# Patient Record
Sex: MALE | Race: BLACK OR AFRICAN AMERICAN | NOT HISPANIC OR LATINO | Employment: UNEMPLOYED | URBAN - METROPOLITAN AREA
[De-identification: names, ages, dates, MRNs, and addresses within clinical notes are randomized per-mention and may not be internally consistent; named-entity substitution may affect disease eponyms.]

---

## 2019-09-13 ENCOUNTER — TELEPHONE (OUTPATIENT)
Dept: TRANSPLANT | Facility: CLINIC | Age: 64
End: 2019-09-13

## 2019-09-13 NOTE — TELEPHONE ENCOUNTER
----- Message from Rosa Parsons sent at 9/12/2019  5:21 PM CDT -----    We have the pt records and they are now pending review by the referral nurse.  By:Rosa Parsons

## 2019-09-18 ENCOUNTER — DOCUMENTATION ONLY (OUTPATIENT)
Dept: TRANSPLANT | Facility: CLINIC | Age: 64
End: 2019-09-18

## 2019-09-18 NOTE — LETTER
September 18, 2019    Sally Cazares MD  [address]  [address] [address]      Dear Dr. Cazares    Patient: Gage Darby   MR Number: [id number]   YOB: 1955     Thank you for the referral of Gage Darby to the [facility] program. An initial appointment will be scheduled for your patient with one of our Hepatologists.      Thank you again for your trust in our program.  If there is anything we can do for you or your staff, please feel free to contact us.        Sincerely,        [facility]  [address] [place] [id number]  [phone]

## 2019-09-18 NOTE — LETTER
September 18, 2019    Gage Darby  [address] [address]      Dear Gage Darby:    Your doctor has referred you to the [facility]. We are sending this letter to remind you to make an appointment with us to complete the referral process.     Please call us at [phone] to schedule an appointment. We look forward to seeing you soon.     If you received a call and have been scheduled, please disregard this letter.       Sincerely,        [facility]   [address] [place] [address]  [phone]

## 2019-09-18 NOTE — NURSING
Pt records reviewed.  Pt will be referred to [facility] due to HEP C   Initial referral received from Sally Cazares NP   Referral letter sent to provider and patient.

## 2019-09-24 ENCOUNTER — TELEPHONE (OUTPATIENT)
Dept: HEPATOLOGY | Facility: CLINIC | Age: 64
End: 2019-09-24

## 2019-10-29 ENCOUNTER — TELEPHONE (OUTPATIENT)
Dept: HEPATOLOGY | Facility: CLINIC | Age: 64
End: 2019-10-29

## 2019-10-29 NOTE — TELEPHONE ENCOUNTER
Sally Cazares NP requested that patient be scheduled for consult.  Attempt made to reach sister.  LVM asking that she give us a call back.  Will attempt to schedule hep c consult appt with PA Scheuermann in [place].  Clinicals already scheduled into media.

## 2019-10-29 NOTE — TELEPHONE ENCOUNTER
----- Message from Suleman Mendiola PA-C sent at 10/29/2019 12:23 PM CDT -----  Regarding: FW: New number      ----- Message -----  From: Tonio Peterson  Sent: 10/29/2019  12:20 PM CDT  To: Suleman Mendiola PA-C  Subject: New number                                       The patients sister in law asked to be contacted on his behalf. The number is [phone]

## 2020-10-07 ENCOUNTER — TELEPHONE (OUTPATIENT)
Dept: SURGERY | Facility: CLINIC | Age: 65
End: 2020-10-07

## 2020-10-07 NOTE — TELEPHONE ENCOUNTER
Called patient at all available numbers in reference to a referral to  Ambulatory Colorectal Surgery for colon cancer screening,invalid numbers

## 2020-10-23 PROBLEM — L98.9 SKIN SORE: Status: ACTIVE | Noted: 2020-10-23

## 2020-10-27 LAB — HCV AB SER-ACNC: NORMAL

## 2022-01-06 ENCOUNTER — PATIENT OUTREACH (OUTPATIENT)
Dept: ADMINISTRATIVE | Facility: HOSPITAL | Age: 67
End: 2022-01-06
Payer: MEDICAID

## 2022-01-18 ENCOUNTER — OFFICE VISIT (OUTPATIENT)
Dept: PRIMARY CARE CLINIC | Facility: CLINIC | Age: 67
End: 2022-01-18
Payer: MEDICAID

## 2022-01-18 VITALS
WEIGHT: 157.44 LBS | HEIGHT: 73 IN | SYSTOLIC BLOOD PRESSURE: 106 MMHG | HEART RATE: 81 BPM | OXYGEN SATURATION: 97 % | DIASTOLIC BLOOD PRESSURE: 78 MMHG | BODY MASS INDEX: 20.86 KG/M2

## 2022-01-18 DIAGNOSIS — B18.2 CHRONIC HEPATITIS C WITHOUT HEPATIC COMA: ICD-10-CM

## 2022-01-18 DIAGNOSIS — R11.0 NAUSEA: ICD-10-CM

## 2022-01-18 DIAGNOSIS — F41.9 ANXIETY: ICD-10-CM

## 2022-01-18 DIAGNOSIS — J44.9 CHRONIC OBSTRUCTIVE PULMONARY DISEASE, UNSPECIFIED COPD TYPE: ICD-10-CM

## 2022-01-18 DIAGNOSIS — G89.29 OTHER CHRONIC PAIN: Primary | ICD-10-CM

## 2022-01-18 DIAGNOSIS — K92.2 UGIB (UPPER GASTROINTESTINAL BLEED): ICD-10-CM

## 2022-01-18 DIAGNOSIS — B18.1 CHRONIC VIRAL HEPATITIS B WITHOUT DELTA AGENT AND WITHOUT COMA: ICD-10-CM

## 2022-01-18 PROBLEM — R19.09 UPPER ABDOMINAL MASS: Status: ACTIVE | Noted: 2020-12-09

## 2022-01-18 PROBLEM — F20.9 SCHIZOPHRENIA: Status: ACTIVE | Noted: 2019-07-15

## 2022-01-18 PROBLEM — K25.9 GASTRIC ULCER: Status: ACTIVE | Noted: 2021-04-30

## 2022-01-18 PROBLEM — C83.30 MALIGNANT LYMPHOMA, LARGE CELL, DIFFUSE: Status: ACTIVE | Noted: 2020-12-23

## 2022-01-18 PROBLEM — F31.9 BIPOLAR DISORDER: Status: ACTIVE | Noted: 2019-07-15

## 2022-01-18 PROBLEM — E43 SEVERE PROTEIN-CALORIE MALNUTRITION: Status: ACTIVE | Noted: 2021-02-15

## 2022-01-18 LAB
AMPHETAMINE, QUAL, UR: NEGATIVE
COCAINE (METABOLITE), QUAL, UR: NEGATIVE
METHAMPHETAMINE, URINE SCREEN: NEGATIVE
OPIATE SCREEN, URINE: NEGATIVE
THC, UR: POSITIVE

## 2022-01-18 PROCEDURE — 99204 PR OFFICE/OUTPT VISIT, NEW, LEVL IV, 45-59 MIN: ICD-10-PCS | Mod: S$PBB,,, | Performed by: FAMILY MEDICINE

## 2022-01-18 PROCEDURE — 99999 PR PBB SHADOW E&M-EST. PATIENT-LVL III: CPT | Mod: PBBFAC,,, | Performed by: FAMILY MEDICINE

## 2022-01-18 PROCEDURE — 99999 PR PBB SHADOW E&M-EST. PATIENT-LVL III: ICD-10-PCS | Mod: PBBFAC,,, | Performed by: FAMILY MEDICINE

## 2022-01-18 PROCEDURE — 3008F BODY MASS INDEX DOCD: CPT | Mod: CPTII,,, | Performed by: FAMILY MEDICINE

## 2022-01-18 PROCEDURE — 3288F FALL RISK ASSESSMENT DOCD: CPT | Mod: CPTII,,, | Performed by: FAMILY MEDICINE

## 2022-01-18 PROCEDURE — 3288F PR FALLS RISK ASSESSMENT DOCUMENTED: ICD-10-PCS | Mod: CPTII,,, | Performed by: FAMILY MEDICINE

## 2022-01-18 PROCEDURE — 1160F PR REVIEW ALL MEDS BY PRESCRIBER/CLIN PHARMACIST DOCUMENTED: ICD-10-PCS | Mod: CPTII,,, | Performed by: FAMILY MEDICINE

## 2022-01-18 PROCEDURE — 80305 DRUG TEST PRSMV DIR OPT OBS: CPT | Mod: PBBFAC,PN | Performed by: FAMILY MEDICINE

## 2022-01-18 PROCEDURE — 3008F PR BODY MASS INDEX (BMI) DOCUMENTED: ICD-10-PCS | Mod: CPTII,,, | Performed by: FAMILY MEDICINE

## 2022-01-18 PROCEDURE — 1125F AMNT PAIN NOTED PAIN PRSNT: CPT | Mod: CPTII,,, | Performed by: FAMILY MEDICINE

## 2022-01-18 PROCEDURE — 99204 OFFICE O/P NEW MOD 45 MIN: CPT | Mod: S$PBB,,, | Performed by: FAMILY MEDICINE

## 2022-01-18 PROCEDURE — 1159F MED LIST DOCD IN RCRD: CPT | Mod: CPTII,,, | Performed by: FAMILY MEDICINE

## 2022-01-18 PROCEDURE — 1125F PR PAIN SEVERITY QUANTIFIED, PAIN PRESENT: ICD-10-PCS | Mod: CPTII,,, | Performed by: FAMILY MEDICINE

## 2022-01-18 PROCEDURE — 3074F PR MOST RECENT SYSTOLIC BLOOD PRESSURE < 130 MM HG: ICD-10-PCS | Mod: CPTII,,, | Performed by: FAMILY MEDICINE

## 2022-01-18 PROCEDURE — 3078F PR MOST RECENT DIASTOLIC BLOOD PRESSURE < 80 MM HG: ICD-10-PCS | Mod: CPTII,,, | Performed by: FAMILY MEDICINE

## 2022-01-18 PROCEDURE — 1160F RVW MEDS BY RX/DR IN RCRD: CPT | Mod: CPTII,,, | Performed by: FAMILY MEDICINE

## 2022-01-18 PROCEDURE — 1101F PT FALLS ASSESS-DOCD LE1/YR: CPT | Mod: CPTII,,, | Performed by: FAMILY MEDICINE

## 2022-01-18 PROCEDURE — 3074F SYST BP LT 130 MM HG: CPT | Mod: CPTII,,, | Performed by: FAMILY MEDICINE

## 2022-01-18 PROCEDURE — 99213 OFFICE O/P EST LOW 20 MIN: CPT | Mod: PBBFAC,PN | Performed by: FAMILY MEDICINE

## 2022-01-18 PROCEDURE — 1101F PR PT FALLS ASSESS DOC 0-1 FALLS W/OUT INJ PAST YR: ICD-10-PCS | Mod: CPTII,,, | Performed by: FAMILY MEDICINE

## 2022-01-18 PROCEDURE — 1159F PR MEDICATION LIST DOCUMENTED IN MEDICAL RECORD: ICD-10-PCS | Mod: CPTII,,, | Performed by: FAMILY MEDICINE

## 2022-01-18 PROCEDURE — 3078F DIAST BP <80 MM HG: CPT | Mod: CPTII,,, | Performed by: FAMILY MEDICINE

## 2022-01-18 RX ORDER — PANTOPRAZOLE SODIUM 40 MG/1
40 TABLET, DELAYED RELEASE ORAL DAILY
Qty: 90 TABLET | Refills: 3 | Status: SHIPPED | OUTPATIENT
Start: 2022-01-18

## 2022-01-18 RX ORDER — PANTOPRAZOLE SODIUM 40 MG/1
40 TABLET, DELAYED RELEASE ORAL
COMMUNITY
Start: 2021-07-26 | End: 2022-01-18 | Stop reason: SDUPTHER

## 2022-01-18 RX ORDER — HYDROXYZINE PAMOATE 25 MG/1
25 CAPSULE ORAL 3 TIMES DAILY PRN
COMMUNITY
Start: 2021-07-01 | End: 2022-01-18 | Stop reason: SDUPTHER

## 2022-01-18 RX ORDER — ONDANSETRON 8 MG/1
8 TABLET, ORALLY DISINTEGRATING ORAL EVERY 6 HOURS PRN
Qty: 30 TABLET | Refills: 1 | Status: SHIPPED | OUTPATIENT
Start: 2022-01-18

## 2022-01-18 RX ORDER — ENTECAVIR 0.5 MG/1
1 TABLET, FILM COATED ORAL DAILY
COMMUNITY
Start: 2021-04-28

## 2022-01-18 RX ORDER — ONDANSETRON 8 MG/1
8 TABLET, ORALLY DISINTEGRATING ORAL
COMMUNITY
Start: 2021-06-16 | End: 2022-01-18 | Stop reason: SDUPTHER

## 2022-01-18 RX ORDER — PROCHLORPERAZINE MALEATE 5 MG
5 TABLET ORAL EVERY 6 HOURS PRN
Qty: 30 TABLET | Refills: 1 | Status: SHIPPED | OUTPATIENT
Start: 2022-01-18 | End: 2023-01-18

## 2022-01-18 RX ORDER — GABAPENTIN 100 MG/1
100 CAPSULE ORAL 3 TIMES DAILY
COMMUNITY
Start: 2021-08-11

## 2022-01-18 RX ORDER — ALBUTEROL SULFATE 90 UG/1
2 AEROSOL, METERED RESPIRATORY (INHALATION) EVERY 6 HOURS PRN
Qty: 18 G | Refills: 3 | Status: SHIPPED | OUTPATIENT
Start: 2022-01-18

## 2022-01-18 RX ORDER — OXYCODONE HYDROCHLORIDE 15 MG/1
TABLET ORAL
COMMUNITY
Start: 2021-11-23

## 2022-01-18 RX ORDER — PREDNISONE 50 MG/1
TABLET ORAL
COMMUNITY
Start: 2021-07-26

## 2022-01-18 RX ORDER — HYDROXYZINE PAMOATE 25 MG/1
25 CAPSULE ORAL EVERY 8 HOURS PRN
Qty: 60 CAPSULE | Refills: 1 | Status: SHIPPED | OUTPATIENT
Start: 2022-01-18

## 2022-01-18 RX ORDER — PROCHLORPERAZINE MALEATE 5 MG
5 TABLET ORAL EVERY 6 HOURS PRN
COMMUNITY
Start: 2021-05-12 | End: 2022-01-18 | Stop reason: SDUPTHER

## 2022-01-18 NOTE — PROGRESS NOTES
Clinic Note  1/18/2022      Subjective:       Patient ID:  Gage is a 66 y.o. male being seen for an new visit.      Chief Complaint: Medication Refill    Establish care - 66yo male with hx of stage IV diffuse large B-cell lymphoma who is already established with [facility] palliative care for symptom management.    Chronic pain - followed by palliative medicine at [facility] for pain management, pain contact signed. Last fill of oxycodone 15mg #30 on 11/23/21. Patient reports being out of his oxycodone for 1 month and having significant abdominal pain. His next appointment with palliative care is in Feb 2022, needs UDS prior to his next refill      History reviewed. No pertinent family history.  Social History     Socioeconomic History    Marital status: Single   Tobacco Use    Smoking status: Current Every Day Smoker     History reviewed. No pertinent surgical history.  Medication List with Changes/Refills   New Medications    ALBUTEROL (PROVENTIL/VENTOLIN HFA) 90 MCG/ACTUATION INHALER    Inhale 2 puffs into the lungs every 6 (six) hours as needed for Wheezing or Shortness of Breath. Rescue   Current Medications    ALBUTEROL SULFATE (PROAIR DIGIHALER INHL)    Inhale into the lungs.    ENTECAVIR (BARACLUDE) 0.5 MG TAB    Take 1 tablet by mouth once daily.    OXYCODONE (ROXICODONE) 15 MG TAB        PREDNISONE (DELTASONE) 50 MG TAB    Take 2 tablets (100 mg) by mouth on days 1-5 of chemotherapy treatment.    WHITE PETROLATUM OIPK    Apply 5 g topically 3 (three) times daily. Apply ulcers and dry itchy skin 2 to 3 times a day   Changed and/or Refilled Medications    Modified Medication Previous Medication    HYDROXYZINE PAMOATE (VISTARIL) 25 MG CAP hydrOXYzine pamoate (VISTARIL) 25 MG Cap       Take 1 capsule (25 mg total) by mouth every 8 (eight) hours as needed (anxiety / insomnia).    Take 25 mg by mouth 3 (three) times daily as needed.    ONDANSETRON (ZOFRAN-ODT) 8 MG TBDL ondansetron (ZOFRAN-ODT) 8 MG TbDL       Take 1  "tablet (8 mg total) by mouth every 6 (six) hours as needed (nausea).    Take 8 mg by mouth.    PANTOPRAZOLE (PROTONIX) 40 MG TABLET pantoprazole (PROTONIX) 40 MG tablet       Take 1 tablet (40 mg total) by mouth once daily. Heartburn    Take 40 mg by mouth.    PROCHLORPERAZINE (COMPAZINE) 5 MG TABLET prochlorperazine (COMPAZINE) 5 MG tablet       Take 1 tablet (5 mg total) by mouth every 6 (six) hours as needed (nausea).    Take 5 mg by mouth every 6 (six) hours as needed.   Discontinued Medications    DICLOFENAC (VOLTAREN) 75 MG EC TABLET    Take 1 tablet (75 mg total) by mouth 2 (two) times daily.    RISPERIDONE (RISPERDAL) 0.5 MG TAB    Take by mouth.     Patient Active Problem List   Diagnosis    Skin lesions, generalized    Chronic hepatitis C virus infection    COPD (chronic obstructive pulmonary disease)    Bipolar disorder    Gastric ulcer    Malignant lymphoma, large cell, diffuse    Schizophrenia    Severe protein-calorie malnutrition    UGIB (upper gastrointestinal bleed)    Upper abdominal mass     Review of Systems   Constitutional: Negative for chills, fever, malaise/fatigue and weight loss.   HENT: Negative for congestion, sinus pain and sore throat.    Respiratory: Negative for cough, shortness of breath and wheezing.    Cardiovascular: Negative for chest pain and palpitations.   Gastrointestinal: Positive for abdominal pain and constipation. Negative for diarrhea, nausea and vomiting.   Genitourinary: Negative for dysuria, flank pain, frequency, hematuria and urgency.   Musculoskeletal: Negative for myalgias.   Skin: Negative for rash.   Neurological: Negative for headaches.         Objective:      /78 (BP Location: Right arm, Patient Position: Sitting, BP Method: Medium (Manual))   Pulse 81   Ht 6' 1" (1.854 m)   Wt 71.4 kg (157 lb 6.5 oz)   SpO2 97%   BMI 20.77 kg/m²   Estimated body mass index is 20.77 kg/m² as calculated from the following:    Height as of this encounter: 6' " "1" (1.854 m).    Weight as of this encounter: 71.4 kg (157 lb 6.5 oz).  Physical Exam  Vitals reviewed.   Constitutional:       General: He is not in acute distress.     Appearance: He is not diaphoretic.   HENT:      Head: Normocephalic and atraumatic.   Eyes:      Conjunctiva/sclera: Conjunctivae normal.   Cardiovascular:      Rate and Rhythm: Normal rate and regular rhythm.      Heart sounds: Normal heart sounds.   Pulmonary:      Effort: Pulmonary effort is normal. No respiratory distress.      Breath sounds: Normal breath sounds. No wheezing.   Abdominal:      General: Bowel sounds are normal.      Palpations: Abdomen is soft.       Musculoskeletal:         General: Normal range of motion.      Cervical back: Normal range of motion.   Skin:     General: Skin is warm and dry.      Findings: No erythema or rash.   Neurological:      Mental Status: He is alert and oriented to person, place, and time.   Psychiatric:         Mood and Affect: Mood and affect normal.         Behavior: Behavior normal.         Thought Content: Thought content normal.         Judgment: Judgment normal.           Assessment and Plan:     1. Other chronic pain  - Pt with poor health literacy. Discussed with patient since he signed pain contract with [facility] palliative care, he needs to get his pain meds refilled from their office. Will obtain UDS today, and patient was given phone number for palliative care office  - on oxycodone 15mg bid and gabapentin 100mg tid  - POCT RAPID DRUG SCREEN    2. Nausea  - prochlorperazine (COMPAZINE) 5 MG tablet; Take 1 tablet (5 mg total) by mouth every 6 (six) hours as needed (nausea).  Dispense: 30 tablet; Refill: 1  - ondansetron (ZOFRAN-ODT) 8 MG TbDL; Take 1 tablet (8 mg total) by mouth every 6 (six) hours as needed (nausea).  Dispense: 30 tablet; Refill: 1    3. Anxiety  - hydrOXYzine pamoate (VISTARIL) 25 MG Cap; Take 1 capsule (25 mg total) by mouth every 8 (eight) hours as needed (anxiety / " insomnia).  Dispense: 60 capsule; Refill: 1    4. Chronic obstructive pulmonary disease, unspecified COPD type  - chronic tobacco use  - albuterol (PROVENTIL/VENTOLIN HFA) 90 mcg/actuation inhaler; Inhale 2 puffs into the lungs every 6 (six) hours as needed for Wheezing or Shortness of Breath. Rescue  Dispense: 18 g; Refill: 3    5. UGIB (upper gastrointestinal bleed)  - pantoprazole (PROTONIX) 40 MG tablet; Take 1 tablet (40 mg total) by mouth once daily. Heartburn  Dispense: 90 tablet; Refill: 3    6. Chronic hepatitis C without hepatic coma  - has not been seen by ID since April 2021, phone was given to patient to call their office to reschedule appointment for treatment    7. Chronic viral hepatitis B without delta agent and without coma  - April 2021 with neg Hep B viral load. Per ID note, patient suppsoed to be on entecavir during chemotherapy and for 1 year thereafter. Patient has enough refills until April 2021        Follow up:   Follow up in about 3 months (around 4/18/2022) for follow-up.   At 3 month f/u, will discuss health maintenance labs and imaging    Other Orders Placed This Visit:  Orders Placed This Encounter   Procedures    POCT RAPID DRUG SCREEN           Fransisco Underwood MD        This note is dictated on M*Modal word recognition program.  There are word recognition mistakes that are occasionally missed on review.

## 2022-01-18 NOTE — PATIENT INSTRUCTIONS
[facility]    [address] [place] [id number] [phone]   Violet Gill NP    [address] [place], [place] [id number] [phone] (Work)    [phone] (Fax)   Palliative care patient;   Cancer related pain         [facility]    [address] [place] [id number] [phone]   Tim Bradley    [address] [place] [address]   Other (Called to remind pt of appointment

## 2022-01-27 RX ORDER — OXYCODONE HYDROCHLORIDE 15 MG/1
TABLET ORAL
OUTPATIENT
Start: 2022-01-27

## 2022-10-13 DIAGNOSIS — Z12.11 COLON CANCER SCREENING: ICD-10-CM

## 2022-11-18 ENCOUNTER — LAB VISIT (OUTPATIENT)
Dept: LAB | Facility: HOSPITAL | Age: 67
End: 2022-11-18
Payer: MEDICAID

## 2022-11-18 DIAGNOSIS — Z12.11 COLON CANCER SCREENING: ICD-10-CM

## 2022-11-18 PROCEDURE — 82274 ASSAY TEST FOR BLOOD FECAL: CPT | Performed by: FAMILY MEDICINE

## 2022-11-22 LAB — HEMOCCULT STL QL IA: NEGATIVE

## 2022-11-29 ENCOUNTER — TELEPHONE (OUTPATIENT)
Dept: PRIMARY CARE CLINIC | Facility: CLINIC | Age: 67
End: 2022-11-29
Payer: MEDICARE

## 2022-11-29 NOTE — TELEPHONE ENCOUNTER
----- Message from Fransisco Underwood MD sent at 11/28/2022  4:07 PM CST -----  Please let patient know that his colon cancer screen is normal.  We can repeat again in 1 year

## 2023-05-10 ENCOUNTER — PATIENT OUTREACH (OUTPATIENT)
Dept: ADMINISTRATIVE | Facility: HOSPITAL | Age: 68
End: 2023-05-10
Payer: MEDICARE

## 2024-02-29 ENCOUNTER — PATIENT OUTREACH (OUTPATIENT)
Dept: ADMINISTRATIVE | Facility: OTHER | Age: 69
End: 2024-02-29
Payer: MEDICARE

## 2024-02-29 NOTE — PROGRESS NOTES
CHW - Initial Contact    This Community Health Worker completed the Social Determinant of Health questionnaire with MRN [id number] over the phone today.    Pt identified barriers of most importance are: No barriers reported   Referrals to community agencies completed with patient/caregiver consent outside of [facility] include: No  Referrals were put through [facility] - No   Support and Services: No support & services have been documented.  Other information discussed the patient needs / wants help with: SDOH completed. No assistance requested at this time.   Follow up required: No   No future outreach task assigned

## 2024-04-15 ENCOUNTER — PATIENT OUTREACH (OUTPATIENT)
Dept: ADMINISTRATIVE | Facility: OTHER | Age: 69
End: 2024-04-15
Payer: MEDICARE

## 2024-04-15 NOTE — PROGRESS NOTES
CHW - Initial Contact    This Community Health Worker reviewed the Social Determinant of Health questionnaire with MRN [id number] today.    Pt identified barriers of most importance are: No barriers reported   Referrals to community agencies completed with patient/caregiver consent outside of [facility] include: No   Referrals were put through [facility] - No   Support and Services: No support & services have been documented.  Other information discussed the patient needs / wants help with: No assistance provided at this time   Follow up required: No   No future outreach task assigned